# Patient Record
(demographics unavailable — no encounter records)

---

## 2024-10-14 NOTE — ASSESSMENT
[FreeTextEntry1] : 66F here for right flank with hx of renal stones  I personally reviewed this patients imaging. The stone density was noted to be  >1000 HU . The skin-to-stone distance was noted to be <10 cm  I discussed the management of urolithiasis with the patient. There are a number of options for kidney stones. These include surveillance of the stone (no treatment), shock wave lithotripsy, ureteroscopy, percutaneous stone removal, and open or laparoscopic surgical removal techniques.   Open or laparoscopic techniques are reserved for special cases and should be avoided if possible.   Surveillance: We can continue to watch the stone over the next year or more. However, I explained that there is always a risk that the stone could get bigger in size or become symptomatic (pain, bleeding, urinary tract infections). While there is limited data examining which patient will eventually need treatment for stones that are asymptomatic, some studies suggest that 20-50% of patients will eventually seek treatment or pass a stone within 5 years. That percentage increases as the size of the stone increases.  Shock wave lithotripsy (SWL) is the least invasive form of surgery for stones. I explained how the procedure is performed and the concept behind shock waves. Procedural success is dependent on a number of stone and environmental factors such as the stone composition or density on CT, presence or absence of hydronephrosis, size of the stone, and location of the stone in the kidney. Skin to stone distance on CT scan also impacts success after SWL. For these reasons, not all stones/patients are good candidates for SWL. The chances of being stone free after SWL are often much lower compared to other modalities, such as ureteroscopy and PCNL, in most cases. While non-invasive, this procedure does carry some risks, mainly bleeding and infection, as well as the small risk (up to 10%) of developing obstruction due to passage of stone fragments, which could require urgent placement of a double-J ureteral stent or nephrostomy tube.  Ureteroscopy: I explained the technique in detail and how it is performed. Though more invasive than SWL, stone free rates greater than 90% have made it more popular among physicians and patients. Very commonly, a ureteral stent is left in place at the conclusion of the procedure, but only if needed. I explained that if a stent is placed, it would need to be removed either cystoscopically under local anesthesia or it may have a string left externally through the urethra for removal in a couple of days after the procedure. Risks of ureteroscopy include, but are not limited to, bleeding, infection, injury to the bladder or ureter, ureteral perforation, ureteral stricture, and other risks involved with general anesthesia. There is also the risk that the procedure needs to be staged into more than one session based on the patient's internal anatomy and the size of the stone(s). Finally, dilation of the ureter and/or ureteral stent placement prior to definitive ureteroscopy may be necessary to achieve ureteral access safely.  Percutaneous nephrolithotomy (PCNL) is generally reserved for large (= 2 cm) and complex stones, staghorn calculi, lower pole renal stones > 1 cm, or stones in complex renal anatomy. It has the highest rate of surgical success (stone free rate > 90%), but is more invasive. It requires an external access site directly into the kidney through the back in order to remove the stones. It is generally reserved for patients with larger stones or staghorn calculi. I generally perform this operation in one step in the operating room (the patient will have their kidney access obtained in the operating room), thus avoiding the need for multiple procedures (e.g. one procedure to have the kidney access tube placed by interventional radiology and a second procedure in the operating room for the actually stone removal). I also like to make sure the patient will leave the hospital without any external drainage tubes if possible. In most cases, patient will need to follow up in 1-2 weeks for a stent removal in the office. PCNL, due to its more invasive nature, does have higher risks of bleeding requiring transfusion (5-15%), fevers (20%), sepsis (3%), and major injury to surrounding organs (pleura or bowel ~ <2%). Anticoagulation should be stopped prior to the procedure to reduce hemorrhage risk. Finally, multiple procedures and/or staged ureteroscopy with laser lithotripsy may be required to clear all stone fragments.   I explained all of these options to the patient and my assessment of the risks and benefits of each procedure for their particular stones.  Risks/benefits/success/recovery expectations all reviewed at length, particularly with respect to patient's comorbidities, and inclusive of infection/sepsis, bleeding, need for secondary procedures or secondary stages such as embolization or open surgery, and even risks of death due to acute issues superimposed on comorbidities.  Pt prefers to undergo: R PCNL vs URS and L URS for maximal session success  Will schedule.  Urine culture, PST appt to schedule once surgery scheduled.     #Nephrolithiasis -Pt to obtain CT stone hunt to determine current stone burden -f/u stone analysis -UA, UCX sent- will call pt with results  - Tentative plan for R PCNL given >2cm in the right, L URS pending CT scan.

## 2024-10-14 NOTE — PHYSICAL EXAM
[General Appearance - Well Developed] : well developed [Heart Rate And Rhythm] : heart rate and rhythm were normal [General Appearance - Well Nourished] : well nourished [] : no respiratory distress [Abdomen Soft] : soft [Normal Station and Gait] : the gait and station were normal for the patient's age [Skin Color & Pigmentation] : normal skin color and pigmentation [No Focal Deficits] : no focal deficits [Oriented To Time, Place, And Person] : oriented to person, place, and time [Affect] : the affect was normal [Not Anxious] : not anxious

## 2024-10-14 NOTE — HISTORY OF PRESENT ILLNESS
[3] : 3 [Right Flank] : right flank [None] : There is no radiation [Dull] : dull [FreeTextEntry1] : : 1957  Referring Provider: SELF,REFERRED  HPI:  Ms. KAT RUSH is a 67 yo F with a PMHx notable for kidney stones, HLD, headaches, s/p gastric bypass. Pt reports having urologist and wanted to establish care with a new urologist. Pt with dull right flank for the past few months- off and on, varying in severity as she has a period of fevers and nausea back in November- none since. No hx of gross hematuria. Pt states she has had a lithotripsy for "big" stones in the past 2 consecutive years and doesn't understand why she is having flank pain again so quickly. Pt denies any stone work-up and unsure of type of stones removed during surgery.   Anticoagulation: None All: NKDA PMHx: Kidney stones- had 2 lithotripsy ( and ) PSHx: Gastric bypass (14 years ago), birthmark removal; tummy tuck. cholecystectomy;  (x2). 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 Right side with largest stone measuring 9mm x 2 , left with several small 3 mm largest calculi, no obstruction or hydronephrosis appreciated. At this time, patient recently passed several stones, with patient now. Interval events with recent weight gain, "spot on the lung" (seeing a pulmanologist),  patient presents to discuss recurrent stone formation. With history of gastric bypass discussed patient's increased risk of stone formation. Recent urine culture ecoli, after which patient was given antibiotics . At this time, discussed likely need to treat the stone and start on a stone prevention plan, given that she has had such quick recurrence of stones. Discussed the need for repeat imaging as last imaging was 2024. Given the existing stone burden, will likely need R PCNL, and possible L URS. UA/UCulture, Stone analysis (from passed stones), CT stone hunt. [facility] tentatively plan R PCNL, L URS.  [Urinary Incontinence] : no urinary incontinence [Urinary Retention] : no urinary retention [Urinary Urgency] : no urinary urgency [Urinary Frequency] : no urinary frequency [Nocturia] : no nocturia [Straining] : no straining [Weak Stream] : no weak stream [Intermittency] : no intermittency [Post-Void Dribbling] : no post-void dribbling [Dysuria] : no dysuria

## 2025-02-18 NOTE — REVIEW OF SYSTEMS
[Chest Tightness] : chest tightness [Negative] : Endocrine [Postnasal Drip] : postnasal drip [Cough] : no cough [Wheezing] : wheezing [SOB on Exertion] : sob on exertion

## 2025-02-18 NOTE — HISTORY OF PRESENT ILLNESS
[TextBox_4] : Ms. RUSH is a 64 year old female with a history of Obesity s/p gastric bypass and sleeve, concussion, hypothyroid, seizure disorder, depression, TMJ, RLS, GERD, kidney stone, Covid 19 1/2022 presenting to the office today for an acute pulmonary evaluation. Her chief complaint is:  -reports SOB on exertion  -reports wheezing, chest tightness -reports she takes her inhalers -reports symptoms started 1.5 weeks ago -reports she recently went to [place] (symptoms worsens while in [place]) -reports she is on weight loss medication - lost approximately 30 lbs -reports she has been on medication for 3 months   -she denies any headaches, nausea, emesis, fever, chills, sweats, chest pain, wheezing, palpitations, diarrhea, constipation, dysphagia, vertigo, arthralgias, myalgias, leg swelling, itchy eyes, itchy ears, or sour taste in the mouth.

## 2025-02-18 NOTE — ASSESSMENT
[FreeTextEntry1] : Ms. RUSH is a 67 year old female with a history of Obesity s/p gastric bypass and sleeve, concussion, hypothyroid, seizure disorder, depression, TMJ, RLS, GERD, kidney stone, Covid 19 1/2022 presenting to the office today for an acute visit for cough x 3 weeks.    problem 1: asthma/ RADs -add prednisone 20mg x 7 days then 10 mg x 7 days -continue Trelegy 200 1 puff QD switched to Breo had adverse reaction to Trelegy, rinse and gargle after use -continue Singulair 10 mg QHS -continue Amitriptyline 10 mg Q8H-add Amitriptyline 10 mg Q8H -add nebulizer -add Levalbuterol 0.63 for nebulizer  -Asthma is believed to be caused by inherited (genetic) and environmental factor, but its exact cause is unknown. Asthma may be triggered by allergens, lung infections, or irritants in the air. Asthma triggers are different for each person  problem 1a: Oral Thrush -s/p Nystatin   problem 2:GERD  -continue Protonix 40 mg QAM, pre-breakfast -For treatment of reflux, possible options discussed including diet control, H2 blockers, PPIs, as well as coating motility agents discussed as treatment options. Timing of meals and proximity of last meal to sleep were discussed. If symptoms persist, a formal gastrointestinal evaluation is needed.  problem 3:allergies -s/p asthma panel, food IgE panel, IgE level, eosinophil level, vitamin D level (NC)-results given to her -Environmental measures for allergies were encouraged including mattress and pillow covers, air purifier, and environmental controls.  problem 4: health maintenance -recommended yearly flu shot after October 15, 2025 -recommended strep pneumonia vaccines: Prevnar-13 vaccine, followed by Pneumo vaccine 23 one year following after 65 -recommended early intervention for Upper Respiratory Infections (URIs) -recommended regular osteoporosis evaluations -recommended early dermatological evaluations -recommended after the age of 50 to the age of 70, colonoscopy every 5 years  F/U 6-8 months  She is encouraged to call with any changes, concerns, or questions.

## 2025-03-07 NOTE — REVIEW OF SYSTEMS
[Postnasal Drip] : postnasal drip [Wheezing] : wheezing [SOB on Exertion] : sob on exertion [Negative] : HEENT

## 2025-03-07 NOTE — HISTORY OF PRESENT ILLNESS
[TextBox_4] : Ms. RUSH is a 64 year old female with a history of Obesity s/p gastric bypass and sleeve, concussion, hypothyroid, seizure disorder, depression, TMJ, RLS, GERD, kidney stone, Covid 19 1/2022 presenting to the office today for an acute pulmonary evaluation. Her chief complaint is:  -reports she became very depressed on prednisone -reports stopped semaglutide one month ago -reports breathing has improved 80% since last visit -reports wheezing has improved since last visit -reports she has been compliant with her inhalers -report SOB when climbing stairs -reports weight and appetite are stable   -she denies any headaches, nausea, emesis, fever, chills, sweats, chest pain, wheezing, palpitations, diarrhea, constipation, dysphagia, vertigo, arthralgias, myalgias, leg swelling, itchy eyes, itchy ears, or sour taste in the mouth.

## 2025-03-07 NOTE — ASSESSMENT
[FreeTextEntry1] : Ms. RUSH is a 67 year old female with a history of Obesity s/p gastric bypass and sleeve, concussion, hypothyroid, seizure disorder, depression, TMJ, RLS, GERD, kidney stone, Covid 19 1/2022 presenting to the office today for an acute visit for cough x 3 weeks.    problem 1: asthma/ RADs -s/p prednisone 20mg x 7 days then 10 mg x 7 days -continue Trelegy 200 1 puff QD switched to Breo had adverse reaction to Trelegy, rinse and gargle after use -continue Singulair 10 mg QHS -continue Amitriptyline 10 mg Q8H -continue nebulizer -continue Levalbuterol 0.63 for nebulizer  -Asthma is believed to be caused by inherited (genetic) and environmental factor, but its exact cause is unknown. Asthma may be triggered by allergens, lung infections, or irritants in the air. Asthma triggers are different for each person Patient had an adverse reaction to Prednisone (do not prescribe for her)  problem 1a: Oral Thrush -s/p Nystatin   problem 2:GERD  -continue Protonix 40 mg QAM, pre-breakfast -For treatment of reflux, possible options discussed including diet control, H2 blockers, PPIs, as well as coating motility agents discussed as treatment options. Timing of meals and proximity of last meal to sleep were discussed. If symptoms persist, a formal gastrointestinal evaluation is needed.  problem 3:allergies -s/p asthma panel, food IgE panel, IgE level, eosinophil level, vitamin D level (NC)-results given to her -Environmental measures for allergies were encouraged including mattress and pillow covers, air purifier, and environmental controls.  problem 4: health maintenance -recommended yearly flu shot after October 15, 2025 -recommended strep pneumonia vaccines: Prevnar-13 vaccine, followed by Pneumo vaccine 23 one year following after 65 -recommended early intervention for Upper Respiratory Infections (URIs) -recommended regular osteoporosis evaluations -recommended early dermatological evaluations -recommended after the age of 50 to the age of 70, colonoscopy every 5 years  F/U 6-8 months  She is encouraged to call with any changes, concerns, or questions.

## 2025-03-20 NOTE — ASSESSMENT
[FreeTextEntry1] : 67F here for nephrolithiasis  #Nephrolithiasis -CT Stone Chavez to establish stone burden  -UCx to r/o UTI -Booking sheet to be entered for R PCNL, L URS depending on which doctor has sooner availability.

## 2025-03-20 NOTE — HISTORY OF PRESENT ILLNESS
[3] : 3 [FreeTextEntry1] : : 1957  Referring Provider: SELF,REFERRED  HPI:  Ms. KAT RUSH is a 67 yo F with a PMHx notable for kidney stones, HLD, headaches, s/p gastric bypass. Pt reports having urologist and wanted to establish care with a new urologist. Pt with dull right flank for the past few months- off and on, varying in severity as she has a period of fevers and nausea back in November- none since. No hx of gross hematuria. Pt states she has had a lithotripsy for "big" stones in the past 2 consecutive years and doesn't understand why she is having flank pain again so quickly. Pt denies any stone work-up and unsure of type of stones removed during surgery.   Anticoagulation: None All: NKDA PMHx: Kidney stones- had 2 lithotripsy ( and ) PSHx: Gastric bypass (14 years ago), birthmark removal; tummy tuck. cholecystectomy;  (x2). 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 Right side with largest stone measuring 9mm x 2 , left with several small 3 mm largest calculi, no obstruction or hydronephrosis appreciated. At this time, patient recently passed several stones, with patient now. Interval events with recent weight gain, "spot on the lung" (seeing a pulmanologist),  patient presents to discuss recurrent stone formation. With history of gastric bypass discussed patient's increased risk of stone formation. Recent urine culture ecoli, after which patient was given antibiotics . At this time, discussed likely need to treat the stone and start on a stone prevention plan, given that she has had such quick recurrence of stones. Discussed the need for repeat imaging as last imaging was 2024. Given the existing stone burden, will likely need R PCNL, and possible L URS. UA/UCulture, Stone analysis (from passed stones), CT stone hunt. [facility] tentatively plan R PCNL, L URS.   3/19/25 Pt seen today for right flank pain and wanting to schedule surgery- aware Dr. Escudero no longer with practice. Last CT Stone Chavez done 10/2024- pt to obtain a new one - order placed to see if pt needs to continue with R PCNL, L URS as previously scheduled. UCX today.  Discussed with pt will need to establish care with stone doctors (Dr. Hoenig vs Dr. Early) for surgical management of stones and care going forward. Discussed PCNL and URS. Pt reports 3/10 back right flank pain but also with hx of back pain- uses lidocaine patch effectively.  Pt aware will need both cardiac and medical clearance.   [Urinary Incontinence] : no urinary incontinence [Urinary Retention] : no urinary retention [Urinary Urgency] : no urinary urgency [Urinary Frequency] : no urinary frequency [Nocturia] : no nocturia [Straining] : no straining [Weak Stream] : no weak stream [Intermittency] : no intermittency [Post-Void Dribbling] : no post-void dribbling [Dysuria] : no dysuria [Hematuria - Gross] : no gross hematuria

## 2025-05-15 NOTE — ADDENDUM
[FreeTextEntry1] : Documented by Jyoti Ramirez acting as a scribe for Dr. Gabino Whaley on 05/15/2025. All medical record entries made by the Scribe were at my, Dr. Gabino Whaley's, direction and personally dictated by me on 05/15/2025. I have reviewed the chart and agree that the record accurately reflects my personal performance of the history, physical exam, assessment and plan. I have also personally directed, reviewed, and agree with the discharge instructions.

## 2025-05-15 NOTE — PHYSICAL EXAM
[No Acute Distress] : no acute distress [Normal Oropharynx] : normal oropharynx [Normal Appearance] : normal appearance [No Neck Mass] : no neck mass [Normal Rate/Rhythm] : normal rate/rhythm [Normal S1, S2] : normal s1, s2 [No Murmurs] : no murmurs [No Resp Distress] : no resp distress [No Abnormalities] : no abnormalities [Benign] : benign [Normal Gait] : normal gait [No Clubbing] : no clubbing [No Cyanosis] : no cyanosis [No Edema] : no edema [FROM] : FROM [Normal Color/ Pigmentation] : normal color/ pigmentation [No Focal Deficits] : no focal deficits [Oriented x3] : oriented x3 [Normal Affect] : normal affect [II] : Mallampati Class: II [TextBox_2] : ow [TextBox_68] : I:E ratio 1:3; clear

## 2025-05-15 NOTE — ASSESSMENT
[FreeTextEntry1] : Ms. RUSH is a 67-year-old female with a history of Obesity s/p gastric bypass and sleeve, concussion, hypothyroid, seizure disorder, depression, TMJ, RLS, GERD, kidney stone, Covid 19 1/2022 s/p Covid 19 PNA with residual SOB, severe persistent asthma, eosinophilic asthma, LPR/ GERD, r/o ANGEL, RLS; ?TBM  Her shortness of breath is multifactorial due to: -poor mechanics of breathing  -out of shape / overweight -pulmonary disease    -severe persistent asthma s/p COVID-19 (eosinophilic asthma)    -?TBM  -?cardiac disease   problem 1: severe persistent asthma post-COVID-19 -add Breztri 2 puffs BID  -continue Albuterol (.83) via nebulizer, up to Q6H  -continue Singulair 10 mg QHS -refused steroids -Asthma is  believed to be caused by inherited (genetic) and environmental factor, but its exact cause is unknown. Asthma may be triggered by allergens, lung infections, or irritants in the air. Asthma triggers are different for each person  -Inhaler technique reviewed as well as oral hygiene techniques reviewed with patient. Avoidance of cold air, extremes of temperature, rescue inhaler should be used before exercise. Order of medication reviewed with patient. Recommended use of a cool mist humidifier in the bedroom.   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 defending the body against parasitic infection). -In severe asthma, too many eosinophils are a key  of asthma pathophysiology, including airway inflammation. Too many airway tissue eosinophils are associated with increased symptoms, likelihood of exacerbations, and airway inflammation.   Problem 1B: ?TBM -Recommend Dynamic CT -continue Amitriptyline 10 mg Q8H  -Tracheomalacia is usually acquired in adults and common causes include damage by tracheostomy or endotracheal intubation damaging the tracheal cartilage with increased risk with multiple intubations, prolonged intubation, and concurrent high dose steroid therapy; external chest wall trauma and surgery; chronic compression of the trachea by benign etiologies (e.g., benign mediastinal goiter) or malignancy; relapsing polychondritis; or recurrent infection. Tracheomalacia can be asymptomatic, however signs or symptoms can develop as the severity of the airway narrowing progresses with major symptoms, including dyspnea, cough, and sputum retention. Other symptoms include severe paroxysms of coughing, wheezing or stridor, barking cough, and may be exacerbated by forced expiration, cough, and Valsalva maneuver. Tracheomalacia is diagnosed by a bronchoscopic visualization of dynamic airway collapse on dynamic chest CT. Therapy is warranted in symptomatic patients with severe tracheomalacia and includes surgical repair, such as tracheobronchoplasty. The patient was referred to Dr. Irving Andino at [facility] for a surgical consult.   problem 2:GERD -continue Omeprazole 40 mg QAM, pre-breakfast  -add Pepcid 40 mg QHS  -Rule of 2s: avoid eating too much, eating too late, eating too spicy, eating two hours before bed. -Things to avoid including overeating, spicy foods, tight clothing, eating within three hours of bed, this list is not all inclusive.  -For treatment of reflux, possible options discussed including diet control, H2 blockers, PPIs, as well as coating motility agents discussed as treatment options. Timing of meals and proximity of last meal to sleep were discussed. If symptoms persist, a formal gastrointestinal evaluation is needed.   problem 3: allergies -s/p Blood work: asthma panel, food IgE panel, IgE level, eosinophil level (310), vitamin D level  -Environmental measures for allergies were encouraged including mattress and pillow covers, air purifier, and environmental controls.   Problem 4: r/o ANGEL -complete home sleep study - NC Sleep apnea is associated with adverse clinical consequences which can affect most organ systems.  Cardiovascular disease risk includes arrhythmias, atrial fibrillation, hypertension, coronary artery disease, and stroke. Metabolic disorders include diabetes type 2, non-alcoholic fatty liver disease. Mood disorder especially depression; and cognitive decline especially in the elderly. Associations with  chronic reflux/Saleh's esophagus some but not all inclusive.  -Reasons  include arousal consistent with hypopnea; respiratory events most prominent in REM sleep or supine position; therefore sleep staging and body position are important for accurate diagnosis and estimation of AHI.   Problem 5: RLS -continue Neurontin 100mg Q8H  Restless Legs Syndrome (RLS), also known as Garcia-Ekbom Disease, is a common sleep -related movement disorder. About 1 in 10 adults in the [place] have problems from restless leg syndrome. It also can be seen in about 2% of children. Women are twice as likely as men to have RLS. People with RLS will have symptoms  most often during times when they are less active, especially at bedtime. RLS most often causes an overwhelming urge to move your legs and sometimes other parts of your body. This urge is associated with unpleasant sensations in different parts of the body. The symptoms can be mild to severe and can affect your ability to go to sleep and stay asleep. People with RLS often sleep less at night and feel more tired during the day.    problem 6: cardiac disease -recommended to continue to follow up with Cardiologist (Dr Lopez, [facility])  problem 7: poor breathing mechanics -recommended Aretha Babin and Harry breathing techniques -recommended internet exercise platforms: [employer] and Aerobic exercise for seniors -Proper breathing techniques were reviewed with an emphasis of exhalation. Patient instructed to breath in for 1 second and out for four seconds. Patient was encouraged to not talk while walking.   problem 8: out of shape / overweight -Weight loss, exercise, and diet control were discussed and are highly encouraged. Treatment options were given such as, aqua therapy, and contacting a nutritionist. Recommended to use the elliptical, stationary bike, less use of treadmill. Mindful eating was explained to the patient Obesity is associated with worsening asthma, shortness of breath, and potential for cardiac disease, diabetes, and other underlying medical conditions  problem 9: health maintenance  -s/p Covid 19 vaccine x2  -s/p yearly flu shot 2024 -recommended strep pneumonia vaccines: Prevnar-13 vaccine, followed by Pneumo vaccine 23 one year following after 65 -recommended early intervention for Upper Respiratory Infections (URIs) -recommended regular osteoporosis evaluations -recommended early dermatological evaluations -recommended after the age of 50 to the age of 70, colonoscopy every 5 years  F/P in 4 months She is encouraged to call with any changes, concerns, or questions

## 2025-05-15 NOTE — REASON FOR VISIT
[Follow-Up] : a follow-up visit [Other: _____] : [unfilled] [TextBox_44] : s/p Covid 19 PNA with residual SOB, ?TBM, severe persistent asthma, eosinophilic asthma, LPR/ GERD, r/o ANGEL, RLS

## 2025-05-15 NOTE — HISTORY OF PRESENT ILLNESS
[TextBox_4] : Ms. RUSH is a 67-year-old female with a history of Obesity s/p gastric bypass and sleeve, concussion, hypothyroid, seizure disorder, depression, TMJ, RLS, GERD, kidney stone, Covid 19 1/2022 presenting to the office today for a follow-up pulmonary evaluation. Her chief complaint is  -she notes she's unable to catch her breath despite using her Breo regularly -she notes heaviness in her chest -she notes she couldn't complete her sentence yesterday due to SOB -she notes she was recently on steroids for her asthma -she notes active heartburn/reflux, for which she's on Omeprazole. Omeprazole is not controlling her symptoms -she notes she's frequently using albuterol for rescue

## 2025-05-15 NOTE — PROCEDURE
[FreeTextEntry1] : Full PFT reveals normal flows; FEV1 was2.23 L which is 118% of predicted; normal lung volumes; normal diffusion at 14.57, which is 84% of predicted; normal flow volume loop. PFTs were performed to evaluate for SOB  FENO was 27; a normal value being less than 25 Fractional exhaled nitric oxide (FENO) is regarded as a simple, noninvasive method for assessing eosinophilic airway inflammation. Produced by a variety of cells within the lung, nitric oxide (NO) concentrations are generally low in healthy individuals. However, high concentrations of NO appear to be involved in nonspecific host defense mechanisms and chronic inflammatory diseases such as asthma. The American Thoracic Society (ATS) therefore has recommended using FENO to aid in the diagnosis and monitoring of eosinophilic airway inflammation and asthma, and for identifying steroid responsive individuals whose chronic respiratory symptoms may be caused by airway inflammation.

## 2025-05-28 NOTE — ASSESSMENT
[FreeTextEntry1] : Ureteral stent removed intact and in it's entirety using attached string tether.  Pt tolerated well. Post stent removal pain/colic cautions advised.  dietary counseling This includes increasing fluid intake to produce 2 to 2.5 liters of urine per day (approx 3 liters intake), and should be primarily water.    Calcium intake should be approximately 1000 mg per day.    Oxalate intake should be reduced- most common sources in diet which have very high levels include peanuts/nuts, tea, coffee, chocolate, spinach, beets, rhubarb, swiss chard.  I've also given/directed to a list with other high oxalate.    Animal flesh protein should be controlled- approx 4 to 6 ounces per day, with some vegetarian days included in the week.  Studies have shown that vegetarians have half the risk of stones of people who eat only 4 ounces per day of meat or fish of any kind (beef, chicken, fish, shellfish, pork, etc), indicating that a vegetarian lifestyle can decrease future stone risks.  Finally, salt intake should be reduced as high levels in the diet will increase urinary calcium.  <2400 mg per day on a low sodium diet is strongly recommended.  Citrate is a benefit; ronnie and limes with most citrate and least sugar- recommend 'a lemon or lime a day'; easiest with concentrate, mixed into water or other beverages.  Lifestyle changes: regular exercise and weight loss both are independent risk-reducers for stones.  In addition, for further details online, go to [email or web address].com <[email or web address]> ---> in the lower left column there is a link for "diet resources", and review or download.  plan 1) 24 hr urine  2) RT in 2 months with Renal US

## 2025-05-28 NOTE — HISTORY OF PRESENT ILLNESS
[Currently Experiencing ___] :  [unfilled] [None] : None [FreeTextEntry1] : 67 yr old female presents Pt returns for metabolic evaluation and prevention of future stone disease following recent surgery for stone.  At issue today is review of the stone analysis, risk factors for future stone episodes and establishing whether they have pure dietary, metabolic, or mixed causes for their stone risks which is unrelated to the surgical management of their stone disease.  s/p right URS with stent placement on 5/22/25 stone- pending

## 2025-05-28 NOTE — PHYSICAL EXAM
[General Appearance - Well Developed] : well developed [] : no respiratory distress [Urethral Meatus] : the meatus of the urethra showed no abnormalities [Skin Color & Pigmentation] : normal skin color and pigmentation [Oriented To Time, Place, And Person] : oriented to person, place, and time [FreeTextEntry2] : Nelda Avery [FreeTextEntry1] : NP